# Patient Record
(demographics unavailable — no encounter records)

---

## 2025-02-20 NOTE — PHYSICAL EXAM
[General Appearance - Well Developed] : well developed [General Appearance - Well Nourished] : well nourished [Chaperone Present] : A chaperone was present in the examining room during all aspects of the physical examination [71590] : A chaperone was present during the pelvic exam. [de-identified] : no vaginal bleeding painful to remove pessary pessary washed, not replaced, given to pt tristian in specimen bag  no visble prolapse  [FreeTextEntry2] : Pelvic exam was chaperoned by Clarissa Short NP

## 2025-02-20 NOTE — HISTORY OF PRESENT ILLNESS
[FreeTextEntry1] : 90 year old G7 vaginal w hx of cystocele has pessary in place since 2017 referred by PCP Dr. Abdulkadir Cavazos accompanied today by granddaughter in law. Pessary has been managed by GYN but due to the patient insurance, they have been having trouble finding a physician to change the pessary Reports incomplete bladder emptying. Complain of vaginal pain  Last pessary change >8 months ago  2-3 UTI in past 12 months DTF q2h Nocturia 2-3 x JENNIFER coughing laughing sneezing and position change UUI denies PPD depends 0  PMhx HTN HLD  Surg hx None  Social hx Drinks  0.5 L water a day Coffee  x1 cup caf tea x1 No cola Never smoker no recreational drugs  PVR =  0   cc

## 2025-02-20 NOTE — END OF VISIT
[Time Spent: ___ minutes] : I have spent [unfilled] minutes of time on the encounter which excludes teaching and separately reported services. [FreeTextEntry3] : The total time spent with the patient includes face to face time as well as time for documentation, ordering medications/labs/procedures, and care coordination, but I acknowledge it does not include time spent on any procedures performed (eg PVR, UDS, Cystoscopy, catheter changes, etc).  Time includes reviewing the chart prior to visit, documentation, and correspondence.

## 2025-02-20 NOTE — ASSESSMENT
[FreeTextEntry1] : Pessary removed  Patient would prefer a female doctor for Spiritism preferences  we will leave the pessary out and see if patient can be evaluated by Dr Senia Cardona in 1-2 weeks for a repeat pelvic exam :  scheduled 3/10/25  Counseled pt about how prolapse is often inaccurate on exam after immediate pessary removal

## 2025-03-10 NOTE — PHYSICAL EXAM
[Normal Appearance] : normal appearance [Well Groomed] : well groomed [General Appearance - In No Acute Distress] : no acute distress [Edema] : no peripheral edema [Abdomen Soft] : soft [Abdomen Tenderness] : non-tender [] : no rash [Oriented To Time, Place, And Person] : oriented to person, place, and time [Affect] : the affect was normal [Mood] : the mood was normal [Chaperone Present] : A chaperone was present in the examining room during all aspects of the physical examination [de-identified] : +vaginal atrophy,urethral caruncle, Aa Ba 0.5+ TVL 8 C -6 Ap Bp -2 [FreeTextEntry2] : Saba RAMEY

## 2025-03-10 NOTE — ASSESSMENT
[FreeTextEntry1] : 90-year-old female who presents for pelvic organ prolapse  Patient with a stage II cystocele.  She is asymptomatic with the pessary out.  Advised at this time I would monitor.  Her bladder scan was reassuring.  If she develops symptoms, we discussed replacing the pessary.  For now she will follow-up as needed

## 2025-03-10 NOTE — REASON FOR VISIT
[TextEntry] : 90-year-old female who presents for pelvic organ prolapse  Patient previously seen by Dr. Hicks on 2/20/2025 when her pessary was removed.  Her PVR was 0 cc.   Patient presents today for follow-up with pelvic exam.  She reports that over the past few weeks, she has had no difficulty with emptying her bladder, bulge symptoms, difficulty with evacuation of stool.  She continues to struggle with constipation with bowel movements every 2 days.  Patient with a history of 7 vaginal deliveries.  She denies vaginal pressure with the pessary out.  She is Ign[place] speaking and with her daughter-in-law who helps provide translation  Bladder scan 46 cc with last void 1 hour ago Ucx neg 2/2025 UA 37 WBC, 0 RBC

## 2025-04-30 NOTE — REASON FOR VISIT
[TextEntry] : 90-year-old female who presents for pelvic organ prolapse  Patient previously seen by Dr. Hicks on 2/20/2025 when her pessary was removed.  Her PVR was 0 cc.   Patient presents today for follow-up with pelvic exam.  She reports that over the past few weeks, she has had no difficulty with emptying her bladder, bulge symptoms, difficulty with evacuation of stool.  She continues to struggle with constipation with bowel movements every 2 days.  Patient with a history of 7 vaginal deliveries.  She denies vaginal pressure with the pessary out.  She is Ignacio speaking and with her daughter-in-law who helps provide translation  Bladder scan 46 cc with last void 1 hour ago Ucx neg 2/2025 UA 37 WBC, 0 RBC Genitourinary:. +vaginal atrophy, urethral caruncle, Aa Ba 0.5+ TVL 8 C -6 Ap Bp -2.   PRN